# Patient Record
(demographics unavailable — no encounter records)

---

## 2024-12-13 NOTE — HISTORY OF PRESENT ILLNESS
[de-identified] : Patient presents for LT hip pain evaluation. Patient states that she had a fall on hardwood floors Saturday 12/7/24. Patient landed on her wrist and hip. Patient went to Urgent care in [place] and took x-rays of her wrist but not the hip. Patient is having lateral hip pain when walking but not when sitting or laying down that started about 2 days ago. Patient is walking with a cane.

## 2024-12-13 NOTE — DISCUSSION/SUMMARY
[de-identified] : I reviewed patient's radiographs and discussed her condition and treatment options.  Defer further imaging.  Continue walking with assistance of cane.   Follow up in 1 week as needed. Patient voiced understanding and agreement with the plan.

## 2024-12-13 NOTE — PHYSICAL EXAM
[NL (120)] : flexion 120 degrees [4___] : flexion 4[unfilled]/5 [2+] : posterior tibialis pulse: 2+ [] : ambulation with cane [Left] : left hip with pelvis [AP] : anteroposterior [Lateral] : lateral [There are no fractures, subluxations or dislocations. No significant abnormalities are seen] : There are no fractures, subluxations or dislocations. No significant abnormalities are seen [FreeTextEntry8] : TTP over left ecchymotic area on buttock

## 2024-12-18 NOTE — CONSULT LETTER
[Dear  ___] : Dear  [unfilled], [Consult Letter:] : I had the pleasure of evaluating your patient, [unfilled]. [Please see my note below.] : Please see my note below. [Consult Closing:] : Thank you very much for allowing me to participate in the care of this patient.  If you have any questions, please do not hesitate to contact me. [Sincerely,] : Sincerely, [FreeTextEntry2] : Jus Rich MD [FreeTextEntry3] : Isabella Morales MD

## 2024-12-18 NOTE — PHYSICAL EXAM
[LE] : Sensory: Intact in bilateral lower extremities [Normal RLE] : Right Lower Extremity: No scars, rashes, lesions, ulcers, skin intact [Normal LLE] : Left Lower Extremity: No scars, rashes, lesions, ulcers, skin intact [Normal Touch] : sensation intact for touch [Normal] : Oriented to person, place, and time, insight and judgement were intact and the affect was normal [de-identified] : LEFT hip and lumbar spine No edema, ecchymosis, erythema. Nontender midline lumbar spine.  Tender in the gluteal region between the sacrum and greater trochanter.  Minimally tender greater trochanter. Nontender anterior thigh. She can do active straight leg raise of the hip. Intact hip abduction with mild pain. No leg length discrepancy. She is able to change position but slowly and feels discomfort in the posterior hip.  Pain with stepping. Motor and sensation intact distally.  Left wrist Resolving ecchymoses.  No significant edema. No severe tenderness distal radius, ulna or carpal bones or hand.  Intact flexion extension of the fingers. Motor and sensation are intact. [de-identified] :  I reviewed the MRI images independently performed on the lumbar spine at [facility] 12/17/2024 showing multilevel degenerative disc disease and disc bulges and osteophytes/facet arthrosis.  There are no compression fractures.  There is grade 1 spondylolisthesis L5-S1 with mild impingement on the right L5 nerve root.  Mild anterolisthesis L3-L4 with disc bulge and degenerative changes but no nerve impingement.  There is mild to moderate central stenosis. Disc bulge and protrusions L3-L4 with moderate central stenosis and mild impingement L3 nerve root bilaterally.  X-rays of the left hip AP and lateral again today are negative.  No fractures seen.  No significant arthritis.  No fractures seen in the sacrum.  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

## 2024-12-18 NOTE — PHYSICAL EXAM
[LE] : Sensory: Intact in bilateral lower extremities [Normal RLE] : Right Lower Extremity: No scars, rashes, lesions, ulcers, skin intact [Normal LLE] : Left Lower Extremity: No scars, rashes, lesions, ulcers, skin intact [Normal Touch] : sensation intact for touch [Normal] : Oriented to person, place, and time, insight and judgement were intact and the affect was normal [de-identified] : LEFT hip and lumbar spine No edema, ecchymosis, erythema. Nontender midline lumbar spine.  Tender in the gluteal region between the sacrum and greater trochanter.  Minimally tender greater trochanter. Nontender anterior thigh. She can do active straight leg raise of the hip. Intact hip abduction with mild pain. No leg length discrepancy. She is able to change position but slowly and feels discomfort in the posterior hip.  Pain with stepping. Motor and sensation intact distally.  Left wrist Resolving ecchymoses.  No significant edema. No severe tenderness distal radius, ulna or carpal bones or hand.  Intact flexion extension of the fingers. Motor and sensation are intact. [de-identified] :  I reviewed the MRI images independently performed on the lumbar spine at [facility] 12/17/2024 showing multilevel degenerative disc disease and disc bulges and osteophytes/facet arthrosis.  There are no compression fractures.  There is grade 1 spondylolisthesis L5-S1 with mild impingement on the right L5 nerve root.  Mild anterolisthesis L3-L4 with disc bulge and degenerative changes but no nerve impingement.  There is mild to moderate central stenosis. Disc bulge and protrusions L3-L4 with moderate central stenosis and mild impingement L3 nerve root bilaterally.  X-rays of the left hip AP and lateral again today are negative.  No fractures seen.  No significant arthritis.  No fractures seen in the sacrum.  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

## 2024-12-18 NOTE — HISTORY OF PRESENT ILLNESS
[de-identified] : Ms. Crane is a 88 year old woman who comes in for evaluation LEFT posterior hip and low back pain that started after she had a fall on hardwood floors Saturday 12/7/24. Patient landed on her wrist and hip. She went to Urgent care in [place] and took x-rays of her wrist that were negative but no x-rays on the hip. She started having posterior, lateral hip pain a couple days later when walking.  Pain is gotten worse.  She is walking with a cane. She saw another orthopedist on 12/13/24 who did x-rays of the hip that were negative for fracture. Patient was concerned she may have done something to her back. Dr. Rich referred her for MRI of lumbar spine which she had last night. He gave her prednisone, but she has not started it yet.  She is walking with a cane.  She ordered a walker. She has history is disc issues L3-L4 20 years ago treated with prednisone and got better.

## 2024-12-18 NOTE — CONSULT LETTER
[Dear  ___] : Dear  [unfilled], [Consult Letter:] : I had the pleasure of evaluating your patient, [unfilled]. [Please see my note below.] : Please see my note below. [Consult Closing:] : Thank you very much for allowing me to participate in the care of this patient.  If you have any questions, please do not hesitate to contact me. [Sincerely,] : Sincerely, [FreeTextEntry2] : Jus Rich MD [FreeTextEntry3] : Isabella Moralse MD

## 2024-12-18 NOTE — ASSESSMENT
[FreeTextEntry1] : 89 y/o female fell a couple weeks ago onto her left wrist and backside.  Her pain seems to localize more in the left gluteal region.  There is no midline lumbar tenderness and MRI shows no compression fractures.  There are multilevel degenerative changes and some mild to moderate lumbar stenosis but this may all be chronic.  No neurologic deficits. I do not think she has a fracture in the hip or pelvis.  If she is having ongoing pain that is not improving with more rest in the next several days or week then she can get an MRI of the pelvis which I ordered gave her prescription. Otherwise I recommended walking with a walker which will help with stability and balance.  Heat and ice.  Tylenol as needed. She cannot take NSAIDs unfortunately.  She was given a prescription of prednisone which she may consider trying.  Tylenol otherwise. Her wrist had a contusion but clinically does not seem to have a fracture and she did have x-rays that were negative.  If that gets worse she should let me know. I would want to see her back in about 3 to 4 weeks to check progress.  She may need some physical therapy but I would allow the acute pain to subside first.

## 2024-12-18 NOTE — HISTORY OF PRESENT ILLNESS
[de-identified] : Ms. Crane is a 88 year old woman who comes in for evaluation LEFT posterior hip and low back pain that started after she had a fall on hardwood floors Saturday 12/7/24. Patient landed on her wrist and hip. She went to Urgent care in [place] and took x-rays of her wrist that were negative but no x-rays on the hip. She started having posterior, lateral hip pain a couple days later when walking.  Pain is gotten worse.  She is walking with a cane. She saw another orthopedist on 12/13/24 who did x-rays of the hip that were negative for fracture. Patient was concerned she may have done something to her back. Dr. Rich referred her for MRI of lumbar spine which she had last night. He gave her prednisone, but she has not started it yet.  She is walking with a cane.  She ordered a walker. She has history is disc issues L3-L4 20 years ago treated with prednisone and got better.